# Patient Record
Sex: FEMALE | Race: WHITE | Employment: FULL TIME | URBAN - METROPOLITAN AREA
[De-identification: names, ages, dates, MRNs, and addresses within clinical notes are randomized per-mention and may not be internally consistent; named-entity substitution may affect disease eponyms.]

---

## 2017-12-19 ENCOUNTER — APPOINTMENT (OUTPATIENT)
Dept: CT IMAGING | Age: 42
End: 2017-12-19
Attending: EMERGENCY MEDICINE
Payer: COMMERCIAL

## 2017-12-19 ENCOUNTER — HOSPITAL ENCOUNTER (EMERGENCY)
Age: 42
Discharge: HOME | End: 2017-12-19
Attending: EMERGENCY MEDICINE
Payer: COMMERCIAL

## 2017-12-19 VITALS
HEIGHT: 68 IN | BODY MASS INDEX: 24.25 KG/M2 | SYSTOLIC BLOOD PRESSURE: 117 MMHG | RESPIRATION RATE: 8 BRPM | TEMPERATURE: 98 F | HEART RATE: 77 BPM | WEIGHT: 160 LBS | OXYGEN SATURATION: 99 % | DIASTOLIC BLOOD PRESSURE: 78 MMHG

## 2017-12-19 DIAGNOSIS — R07.9 LEFT SIDED CHEST PAIN: Primary | ICD-10-CM

## 2017-12-19 DIAGNOSIS — R06.00 DYSPNEA, UNSPECIFIED TYPE: ICD-10-CM

## 2017-12-19 LAB
ALBUMIN SERPL-MCNC: 3.5 G/DL (ref 3.5–5)
ALBUMIN/GLOB SERPL: 1.1 {RATIO} (ref 1.1–2.2)
ALP SERPL-CCNC: 76 U/L (ref 45–117)
ALT SERPL-CCNC: 14 U/L (ref 12–78)
ANION GAP SERPL CALC-SCNC: 10 MMOL/L (ref 5–15)
APPEARANCE UR: ABNORMAL
AST SERPL-CCNC: 14 U/L (ref 15–37)
ATRIAL RATE: 89 BPM
BACTERIA URNS QL MICRO: ABNORMAL /HPF
BASOPHILS # BLD: 0 K/UL (ref 0–0.1)
BASOPHILS NFR BLD: 0 % (ref 0–1)
BILIRUB SERPL-MCNC: 0.5 MG/DL (ref 0.2–1)
BILIRUB UR QL: NEGATIVE
BUN SERPL-MCNC: 7 MG/DL (ref 6–20)
BUN/CREAT SERPL: 9 (ref 12–20)
CALCIUM SERPL-MCNC: 8.2 MG/DL (ref 8.5–10.1)
CALCULATED P AXIS, ECG09: 63 DEGREES
CALCULATED R AXIS, ECG10: 71 DEGREES
CALCULATED T AXIS, ECG11: 55 DEGREES
CHLORIDE SERPL-SCNC: 105 MMOL/L (ref 97–108)
CO2 SERPL-SCNC: 27 MMOL/L (ref 21–32)
COLOR UR: ABNORMAL
CREAT SERPL-MCNC: 0.81 MG/DL (ref 0.55–1.02)
DIAGNOSIS, 93000: NORMAL
EOSINOPHIL # BLD: 0.1 K/UL (ref 0–0.4)
EOSINOPHIL NFR BLD: 2 % (ref 0–7)
EPITH CASTS URNS QL MICRO: ABNORMAL /LPF
ERYTHROCYTE [DISTWIDTH] IN BLOOD BY AUTOMATED COUNT: 12.9 % (ref 11.5–14.5)
GLOBULIN SER CALC-MCNC: 3.1 G/DL (ref 2–4)
GLUCOSE SERPL-MCNC: 92 MG/DL (ref 65–100)
GLUCOSE UR STRIP.AUTO-MCNC: NEGATIVE MG/DL
HCG UR QL: NEGATIVE
HCT VFR BLD AUTO: 37.1 % (ref 35–47)
HGB BLD-MCNC: 12.9 G/DL (ref 11.5–16)
HGB UR QL STRIP: NEGATIVE
KETONES UR QL STRIP.AUTO: NEGATIVE MG/DL
LEUKOCYTE ESTERASE UR QL STRIP.AUTO: NEGATIVE
LIPASE SERPL-CCNC: 99 U/L (ref 73–393)
LYMPHOCYTES # BLD: 1.8 K/UL (ref 0.8–3.5)
LYMPHOCYTES NFR BLD: 26 % (ref 12–49)
MCH RBC QN AUTO: 31.1 PG (ref 26–34)
MCHC RBC AUTO-ENTMCNC: 34.8 G/DL (ref 30–36.5)
MCV RBC AUTO: 89.4 FL (ref 80–99)
MONOCYTES # BLD: 0.5 K/UL (ref 0–1)
MONOCYTES NFR BLD: 8 % (ref 5–13)
NEUTS SEG # BLD: 4.4 K/UL (ref 1.8–8)
NEUTS SEG NFR BLD: 64 % (ref 32–75)
NITRITE UR QL STRIP.AUTO: NEGATIVE
P-R INTERVAL, ECG05: 130 MS
PH UR STRIP: 6.5 [PH] (ref 5–8)
PLATELET # BLD AUTO: 202 K/UL (ref 150–400)
POTASSIUM SERPL-SCNC: 3.9 MMOL/L (ref 3.5–5.1)
PROT SERPL-MCNC: 6.6 G/DL (ref 6.4–8.2)
PROT UR STRIP-MCNC: NEGATIVE MG/DL
Q-T INTERVAL, ECG07: 368 MS
QRS DURATION, ECG06: 72 MS
QTC CALCULATION (BEZET), ECG08: 447 MS
RBC # BLD AUTO: 4.15 M/UL (ref 3.8–5.2)
RBC #/AREA URNS HPF: ABNORMAL /HPF (ref 0–5)
SODIUM SERPL-SCNC: 142 MMOL/L (ref 136–145)
SP GR UR REFRACTOMETRY: 1.01 (ref 1–1.03)
TROPONIN I BLD-MCNC: <0.04 NG/ML (ref 0–0.08)
TROPONIN I SERPL-MCNC: <0.04 NG/ML
UR CULT HOLD, URHOLD: NORMAL
UROBILINOGEN UR QL STRIP.AUTO: 0.2 EU/DL (ref 0.2–1)
VENTRICULAR RATE, ECG03: 89 BPM
WBC # BLD AUTO: 6.9 K/UL (ref 3.6–11)
WBC URNS QL MICRO: ABNORMAL /HPF (ref 0–4)

## 2017-12-19 PROCEDURE — 71275 CT ANGIOGRAPHY CHEST: CPT

## 2017-12-19 PROCEDURE — 84484 ASSAY OF TROPONIN QUANT: CPT | Performed by: EMERGENCY MEDICINE

## 2017-12-19 PROCEDURE — 99285 EMERGENCY DEPT VISIT HI MDM: CPT

## 2017-12-19 PROCEDURE — 36415 COLL VENOUS BLD VENIPUNCTURE: CPT | Performed by: EMERGENCY MEDICINE

## 2017-12-19 PROCEDURE — 74011250636 HC RX REV CODE- 250/636: Performed by: EMERGENCY MEDICINE

## 2017-12-19 PROCEDURE — 74011636320 HC RX REV CODE- 636/320: Performed by: RADIOLOGY

## 2017-12-19 PROCEDURE — 85025 COMPLETE CBC W/AUTO DIFF WBC: CPT | Performed by: EMERGENCY MEDICINE

## 2017-12-19 PROCEDURE — 81025 URINE PREGNANCY TEST: CPT

## 2017-12-19 PROCEDURE — 96361 HYDRATE IV INFUSION ADD-ON: CPT

## 2017-12-19 PROCEDURE — 83690 ASSAY OF LIPASE: CPT | Performed by: EMERGENCY MEDICINE

## 2017-12-19 PROCEDURE — 96374 THER/PROPH/DIAG INJ IV PUSH: CPT

## 2017-12-19 PROCEDURE — 80053 COMPREHEN METABOLIC PANEL: CPT | Performed by: EMERGENCY MEDICINE

## 2017-12-19 PROCEDURE — 81001 URINALYSIS AUTO W/SCOPE: CPT | Performed by: EMERGENCY MEDICINE

## 2017-12-19 PROCEDURE — 93005 ELECTROCARDIOGRAM TRACING: CPT

## 2017-12-19 RX ORDER — BUPROPION HYDROCHLORIDE 300 MG/1
300 TABLET ORAL
COMMUNITY

## 2017-12-19 RX ORDER — KETOROLAC TROMETHAMINE 30 MG/ML
30 INJECTION, SOLUTION INTRAMUSCULAR; INTRAVENOUS
Status: COMPLETED | OUTPATIENT
Start: 2017-12-19 | End: 2017-12-19

## 2017-12-19 RX ADMIN — KETOROLAC TROMETHAMINE 30 MG: 30 INJECTION, SOLUTION INTRAMUSCULAR at 06:18

## 2017-12-19 RX ADMIN — IOPAMIDOL 80 ML: 755 INJECTION, SOLUTION INTRAVENOUS at 07:33

## 2017-12-19 RX ADMIN — SODIUM CHLORIDE 1000 ML: 900 INJECTION, SOLUTION INTRAVENOUS at 06:25

## 2017-12-19 NOTE — ED NOTES
Pt reports shortness of breath has improved, but still has a tightness feeling that increased with inspiration.

## 2017-12-19 NOTE — ED PROVIDER NOTES
HPI Comments: Jagruti Paulino is a 43 y.o. female who presents via private vehicle to the ED with a c/o left sided chest pain and SOB. Pt states that she woke up with shortness of breath. Pt states that the pain is worse when taking a deep breath. Pt denies any injury. PCP: Olean Hammans  PMHx significant for: none  PSHx significant for: breast augmentation, tummy tuck  Social Hx: Tobacco: none EtOH: none Illicit drug use: marijuana    There are no further complaints or symptoms at this time. Patient is a 43 y.o. female presenting with shortness of breath. The history is provided by the patient. No  was used. Shortness of Breath   This is a new problem. The problem occurs continuously. The current episode started 1 to 2 hours ago. The problem has not changed since onset. Associated symptoms include chest pain. Pertinent negatives include no fever, no rhinorrhea, no ear pain, no cough, no wheezing, no vomiting, no abdominal pain and no rash. She has tried nothing for the symptoms. She has had no prior hospitalizations. She has had no prior ED visits. Associated medical issues do not include asthma, COPD, pneumonia, PE or CAD. History reviewed. No pertinent past medical history. Past Surgical History:   Procedure Laterality Date    HX OTHER SURGICAL      tummy tuck, breastaugmmentation         History reviewed. No pertinent family history. Social History     Social History    Marital status:      Spouse name: N/A    Number of children: N/A    Years of education: N/A     Occupational History    Not on file.      Social History Main Topics    Smoking status: Never Smoker    Smokeless tobacco: Never Used    Alcohol use Yes      Comment: occ    Drug use: Yes     Special: Marijuana    Sexual activity: Not on file     Other Topics Concern    Not on file     Social History Narrative    No narrative on file     ALLERGIES: Review of patient's allergies indicates no known allergies. Review of Systems   Constitutional: Negative for appetite change, chills, fever and unexpected weight change. HENT: Negative for ear pain, hearing loss, rhinorrhea and trouble swallowing. Eyes: Negative for pain and visual disturbance. Respiratory: Positive for shortness of breath. Negative for cough, chest tightness and wheezing. Cardiovascular: Positive for chest pain. Negative for palpitations. Gastrointestinal: Negative for abdominal distention, abdominal pain, blood in stool and vomiting. Genitourinary: Negative for dysuria, hematuria and urgency. Musculoskeletal: Negative for back pain and myalgias. Skin: Negative for rash. Neurological: Negative for dizziness, syncope, weakness and numbness. Psychiatric/Behavioral: Negative for confusion and suicidal ideas. All other systems reviewed and are negative. Vitals:    12/19/17 0613 12/19/17 0615 12/19/17 0645 12/19/17 0739   BP: 112/71 125/69 108/67 128/71   Pulse:  93 82 73   Resp:  10 16 14   Temp:       SpO2: 97% 96% 100% 100%   Weight:       Height:                Physical Exam   Constitutional: She is oriented to person, place, and time. She appears well-developed and well-nourished. No distress. HENT:   Head: Normocephalic and atraumatic. Right Ear: External ear normal.   Left Ear: External ear normal.   Nose: Nose normal.   Mouth/Throat: Oropharynx is clear and moist. No oropharyngeal exudate. Eyes: Conjunctivae and EOM are normal. Pupils are equal, round, and reactive to light. Right eye exhibits no discharge. Left eye exhibits no discharge. No scleral icterus. Neck: Normal range of motion. Neck supple. No JVD present. No tracheal deviation present. Cardiovascular: Normal rate, regular rhythm, normal heart sounds and intact distal pulses. Exam reveals no gallop and no friction rub. No murmur heard. Pulmonary/Chest: Effort normal and breath sounds normal. No stridor. No respiratory distress.  She has no decreased breath sounds. She has no wheezes. She has no rhonchi. She has no rales. She exhibits no tenderness. Abdominal: Soft. Bowel sounds are normal. She exhibits no distension. There is no tenderness. There is no rebound and no guarding. Musculoskeletal: Normal range of motion. She exhibits no edema or tenderness. Neurological: She is alert and oriented to person, place, and time. She has normal strength and normal reflexes. No cranial nerve deficit or sensory deficit. She exhibits normal muscle tone. Coordination normal. GCS eye subscore is 4. GCS verbal subscore is 5. GCS motor subscore is 6. Skin: Skin is warm and dry. No rash noted. She is not diaphoretic. No erythema. No pallor. Psychiatric: She has a normal mood and affect. Her behavior is normal. Judgment and thought content normal.   Nursing note and vitals reviewed. MDM  Number of Diagnoses or Management Options  Dyspnea, unspecified type:   Left sided chest pain:      Amount and/or Complexity of Data Reviewed  Clinical lab tests: ordered and reviewed  Tests in the radiology section of CPT®: ordered and reviewed  Tests in the medicine section of CPT®: ordered and reviewed  Independent visualization of images, tracings, or specimens: yes (ekg)    Risk of Complications, Morbidity, and/or Mortality  Presenting problems: moderate  Diagnostic procedures: moderate  Management options: moderate    Patient Progress  Patient progress: stable    ED Course       Procedures    Chief Complaint   Patient presents with    Shortness of Breath       The patient's presenting problems have been discussed, and they are in agreement with the care plan formulated and outlined with them. I have encouraged them to ask questions as they arise throughout their visit.     MEDICATIONS GIVEN:  Medications   sodium chloride 0.9 % bolus infusion 1,000 mL (0 mL IntraVENous IV Completed 12/19/17 7848)   ketorolac (TORADOL) injection 30 mg (30 mg IntraVENous Given 12/19/17 0618)   iopamidol (ISOVUE-370) 76 % injection 100 mL (80 mL IntraVENous Given 12/19/17 0733)       LABS REVIEWED:  Recent Results (from the past 24 hour(s))   EKG, 12 LEAD, INITIAL    Collection Time: 12/19/17  5:58 AM   Result Value Ref Range    Ventricular Rate 89 BPM    Atrial Rate 89 BPM    P-R Interval 130 ms    QRS Duration 72 ms    Q-T Interval 368 ms    QTC Calculation (Bezet) 447 ms    Calculated P Axis 63 degrees    Calculated R Axis 71 degrees    Calculated T Axis 55 degrees    Diagnosis       Normal sinus rhythm  Normal ECG  No previous ECGs available     CBC WITH AUTOMATED DIFF    Collection Time: 12/19/17  6:12 AM   Result Value Ref Range    WBC 6.9 3.6 - 11.0 K/uL    RBC 4.15 3.80 - 5.20 M/uL    HGB 12.9 11.5 - 16.0 g/dL    HCT 37.1 35.0 - 47.0 %    MCV 89.4 80.0 - 99.0 FL    MCH 31.1 26.0 - 34.0 PG    MCHC 34.8 30.0 - 36.5 g/dL    RDW 12.9 11.5 - 14.5 %    PLATELET 431 862 - 753 K/uL    NEUTROPHILS 64 32 - 75 %    LYMPHOCYTES 26 12 - 49 %    MONOCYTES 8 5 - 13 %    EOSINOPHILS 2 0 - 7 %    BASOPHILS 0 0 - 1 %    ABS. NEUTROPHILS 4.4 1.8 - 8.0 K/UL    ABS. LYMPHOCYTES 1.8 0.8 - 3.5 K/UL    ABS. MONOCYTES 0.5 0.0 - 1.0 K/UL    ABS. EOSINOPHILS 0.1 0.0 - 0.4 K/UL    ABS. BASOPHILS 0.0 0.0 - 0.1 K/UL   METABOLIC PANEL, COMPREHENSIVE    Collection Time: 12/19/17  6:12 AM   Result Value Ref Range    Sodium 142 136 - 145 mmol/L    Potassium 3.9 3.5 - 5.1 mmol/L    Chloride 105 97 - 108 mmol/L    CO2 27 21 - 32 mmol/L    Anion gap 10 5 - 15 mmol/L    Glucose 92 65 - 100 mg/dL    BUN 7 6 - 20 MG/DL    Creatinine 0.81 0.55 - 1.02 MG/DL    BUN/Creatinine ratio 9 (L) 12 - 20      GFR est AA >60 >60 ml/min/1.73m2    GFR est non-AA >60 >60 ml/min/1.73m2    Calcium 8.2 (L) 8.5 - 10.1 MG/DL    Bilirubin, total 0.5 0.2 - 1.0 MG/DL    ALT (SGPT) 14 12 - 78 U/L    AST (SGOT) 14 (L) 15 - 37 U/L    Alk.  phosphatase 76 45 - 117 U/L    Protein, total 6.6 6.4 - 8.2 g/dL    Albumin 3.5 3.5 - 5.0 g/dL    Globulin 3.1 2.0 - 4.0 g/dL    A-G Ratio 1.1 1.1 - 2.2     LIPASE    Collection Time: 12/19/17  6:12 AM   Result Value Ref Range    Lipase 99 73 - 393 U/L   URINALYSIS W/MICROSCOPIC    Collection Time: 12/19/17  6:12 AM   Result Value Ref Range    Color YELLOW/STRAW      Appearance CLOUDY (A) CLEAR      Specific gravity 1.012 1.003 - 1.030      pH (UA) 6.5 5.0 - 8.0      Protein NEGATIVE  NEG mg/dL    Glucose NEGATIVE  NEG mg/dL    Ketone NEGATIVE  NEG mg/dL    Bilirubin NEGATIVE  NEG      Blood NEGATIVE  NEG      Urobilinogen 0.2 0.2 - 1.0 EU/dL    Nitrites NEGATIVE  NEG      Leukocyte Esterase NEGATIVE  NEG      WBC 5-10 0 - 4 /hpf    RBC 0-5 0 - 5 /hpf    Epithelial cells MODERATE (A) FEW /lpf    Bacteria 2+ (A) NEG /hpf   URINE CULTURE HOLD SAMPLE    Collection Time: 12/19/17  6:12 AM   Result Value Ref Range    Urine culture hold URINE ON HOLD IN MICROBIOLOGY DEPT FOR 3 DAYS     TROPONIN I    Collection Time: 12/19/17  6:12 AM   Result Value Ref Range    Troponin-I, Qt. <0.04 <0.05 ng/mL   HCG URINE, QL. - POC    Collection Time: 12/19/17  6:22 AM   Result Value Ref Range    Pregnancy test,urine (POC) NEGATIVE  NEG     POC TROPONIN-I    Collection Time: 12/19/17  7:49 AM   Result Value Ref Range    Troponin-I (POC) <0.04 0.00 - 0.08 ng/mL       VITAL SIGNS:  Patient Vitals for the past 12 hrs:   Temp Pulse Resp BP SpO2   12/19/17 0739 - 73 14 128/71 100 %   12/19/17 0645 - 82 16 108/67 100 %   12/19/17 0615 - 93 10 125/69 96 %   12/19/17 0613 - - - 112/71 97 %   12/19/17 0603 - - - - 100 %   12/19/17 0548 98.2 °F (36.8 °C) 98 18 135/77 100 %       RADIOLOGY RESULTS:  The following have been ordered and reviewed:  Cta Chest W Or W Wo Cont    Result Date: 12/19/2017  EXAM:  CTA CHEST W OR W WO CONT INDICATION:   left sided chest pain, shortness of breath COMPARISON: None. TECHNIQUE: Precontrast  images were obtained to localize the volume for acquisition.  Multislice helical CT arteriography was performed from the diaphragm to the thoracic inlet during uneventful rapid bolus of 8 0 cc Isovue-370. Lung and soft tissue windows were generated. Coronal and sagittal images were generated and 3D post processing consisting of coronal maximum intensity images was performed. CT dose reduction was achieved through use of a standardized protocol tailored for this examination and automatic exposure control for dose modulation. FINDINGS: CHEST: THYROID: No nodule. MEDIASTINUM: No mass or lymphadenopathy. LATOYA: No mass or lymphadenopathy. THORACIC AORTA: No dissection or aneurysm. MAIN PULMONARY ARTERY: There is no evidence of pulmonary embolism. TRACHEA/BRONCHI: Patent. ESOPHAGUS: No wall thickening or dilatation. HEART: Normal in size. PLEURA: No effusion or pneumothorax. LUNGS: No nodule, mass, or airspace disease. INCIDENTALLY IMAGED UPPER ABDOMEN: No focal abnormality. BONES: No destructive bone lesion. IMPRESSION: No acute process or evidence of pulmonary embolism. ED EKG interpretation:  Rhythm: normal sinus rhythm; and regular . Rate (approx.): 89; Axis: normal; P wave: normal; QRS interval: normal ; ST/T wave: normal; Other findings: normal. This EKG was interpreted by Gisela Michel DO, ED Provider. PROGRESS NOTES:  Second trop negative. CTA no PE. Pt feeling better. Discussed results and plan with patient. Patient will be discharged home with PCP followup. Patient instructed to return to the emergency room for any worsening symptoms or any other concerns. DIAGNOSIS:    1. Left sided chest pain    2.  Dyspnea, unspecified type        PLAN:  Follow-up Information     Follow up With Details Comments Contact Info    Elliot Hurst MD Schedule an appointment as soon as possible for a visit  [place] [address]      [facility] EMERGENCY DEPT  If symptoms worsen [address] [place] [address]  [phone]        Current Discharge Medication List CONTINUE these medications which have NOT CHANGED    Details   buPROPion XL (WELLBUTRIN XL) 300 mg XL tablet Take 300 mg by mouth every morning. ED COURSE: The patient's hospital course has been uncomplicated.

## 2017-12-19 NOTE — ED NOTES
Bedside and Verbal shift change report given to Kurt Carrillo RN (oncoming nurse) by Justina Brown RN (offgoing nurse). Report included the following information SBAR, Kardex, ED Summary, MAR and Accordion.

## 2017-12-19 NOTE — DISCHARGE INSTRUCTIONS
We hope that we have addressed all of your medical concerns. The examination and treatment you received in the Emergency Department were for an emergent problem and were not intended as complete care. It is important that you follow up with your healthcare provider(s) for ongoing care. If your symptoms worsen or do not improve as expected, and you are unable to reach your usual health care provider(s), you should return to the Emergency Department. Today's healthcare is undergoing tremendous change, and patient satisfaction surveys are one of the many tools to assess the quality of medical care. You may receive a survey from the [employer] regarding your experience in the Emergency Department. I hope that your experience has been completely positive, particularly the medical care that I provided. As such, please participate in the survey; anything less than excellent does not meet my expectations or intentions. [facility] and [facility] participate in nationally recognized quality of care measures. If your blood pressure is greater than 120/80, as reported below, we urge that you seek medical care to address the potential of high blood pressure, commonly known as hypertension. Hypertension can be hereditary or can be caused by certain medical conditions, pain, stress, or \"white coat syndrome. \"       Please make an appointment with your health care provider(s) for follow up of your Emergency Department visit. VITALS:   Patient Vitals for the past 8 hrs:   Temp Pulse Resp BP SpO2   12/19/17 0739 - 73 14 128/71 100 %   12/19/17 0645 - 82 16 108/67 100 %   12/19/17 0615 - 93 10 125/69 96 %   12/19/17 0613 - - - 112/71 97 %   12/19/17 0603 - - - - 100 %   12/19/17 0548 98.2 °F (36.8 °C) 98 18 135/77 100 %          Thank you for allowing us to provide you with medical care today.   We realize that you have many choices for your emergency care needs. Please choose us in the future for any continued health care needs. Esau Ross [address] [place] [address].   Office: [phone]            Recent Results (from the past 24 hour(s))   EKG, 12 LEAD, INITIAL    Collection Time: 12/19/17  5:58 AM   Result Value Ref Range    Ventricular Rate 89 BPM    Atrial Rate 89 BPM    P-R Interval 130 ms    QRS Duration 72 ms    Q-T Interval 368 ms    QTC Calculation (Bezet) 447 ms    Calculated P Axis 63 degrees    Calculated R Axis 71 degrees    Calculated T Axis 55 degrees    Diagnosis       Normal sinus rhythm  Normal ECG  No previous ECGs available     CBC WITH AUTOMATED DIFF    Collection Time: 12/19/17  6:12 AM   Result Value Ref Range    WBC 6.9 3.6 - 11.0 K/uL    RBC 4.15 3.80 - 5.20 M/uL    HGB 12.9 11.5 - 16.0 g/dL    HCT 37.1 35.0 - 47.0 %    MCV 89.4 80.0 - 99.0 FL    MCH 31.1 26.0 - 34.0 PG    MCHC 34.8 30.0 - 36.5 g/dL    RDW 12.9 11.5 - 14.5 %    PLATELET 153 913 - 486 K/uL    NEUTROPHILS 64 32 - 75 %    LYMPHOCYTES 26 12 - 49 %    MONOCYTES 8 5 - 13 %    EOSINOPHILS 2 0 - 7 %    BASOPHILS 0 0 - 1 %    ABS. NEUTROPHILS 4.4 1.8 - 8.0 K/UL    ABS. LYMPHOCYTES 1.8 0.8 - 3.5 K/UL    ABS. MONOCYTES 0.5 0.0 - 1.0 K/UL    ABS. EOSINOPHILS 0.1 0.0 - 0.4 K/UL    ABS. BASOPHILS 0.0 0.0 - 0.1 K/UL   METABOLIC PANEL, COMPREHENSIVE    Collection Time: 12/19/17  6:12 AM   Result Value Ref Range    Sodium 142 136 - 145 mmol/L    Potassium 3.9 3.5 - 5.1 mmol/L    Chloride 105 97 - 108 mmol/L    CO2 27 21 - 32 mmol/L    Anion gap 10 5 - 15 mmol/L    Glucose 92 65 - 100 mg/dL    BUN 7 6 - 20 MG/DL    Creatinine 0.81 0.55 - 1.02 MG/DL    BUN/Creatinine ratio 9 (L) 12 - 20      GFR est AA >60 >60 ml/min/1.73m2    GFR est non-AA >60 >60 ml/min/1.73m2    Calcium 8.2 (L) 8.5 - 10.1 MG/DL    Bilirubin, total 0.5 0.2 - 1.0 MG/DL    ALT (SGPT) 14 12 - 78 U/L    AST (SGOT) 14 (L) 15 - 37 U/L    Alk.  phosphatase 76 45 - 117 U/L Protein, total 6.6 6.4 - 8.2 g/dL    Albumin 3.5 3.5 - 5.0 g/dL    Globulin 3.1 2.0 - 4.0 g/dL    A-G Ratio 1.1 1.1 - 2.2     LIPASE    Collection Time: 12/19/17  6:12 AM   Result Value Ref Range    Lipase 99 73 - 393 U/L   URINALYSIS W/MICROSCOPIC    Collection Time: 12/19/17  6:12 AM   Result Value Ref Range    Color YELLOW/STRAW      Appearance CLOUDY (A) CLEAR      Specific gravity 1.012 1.003 - 1.030      pH (UA) 6.5 5.0 - 8.0      Protein NEGATIVE  NEG mg/dL    Glucose NEGATIVE  NEG mg/dL    Ketone NEGATIVE  NEG mg/dL    Bilirubin NEGATIVE  NEG      Blood NEGATIVE  NEG      Urobilinogen 0.2 0.2 - 1.0 EU/dL    Nitrites NEGATIVE  NEG      Leukocyte Esterase NEGATIVE  NEG      WBC 5-10 0 - 4 /hpf    RBC 0-5 0 - 5 /hpf    Epithelial cells MODERATE (A) FEW /lpf    Bacteria 2+ (A) NEG /hpf   URINE CULTURE HOLD SAMPLE    Collection Time: 12/19/17  6:12 AM   Result Value Ref Range    Urine culture hold URINE ON HOLD IN MICROBIOLOGY DEPT FOR 3 DAYS     TROPONIN I    Collection Time: 12/19/17  6:12 AM   Result Value Ref Range    Troponin-I, Qt. <0.04 <0.05 ng/mL   HCG URINE, QL. - POC    Collection Time: 12/19/17  6:22 AM   Result Value Ref Range    Pregnancy test,urine (POC) NEGATIVE  NEG         Cta Chest W Or W Wo Cont    Result Date: 12/19/2017  EXAM:  CTA CHEST W OR W WO CONT INDICATION:   left sided chest pain, shortness of breath COMPARISON: None. TECHNIQUE: Precontrast  images were obtained to localize the volume for acquisition. Multislice helical CT arteriography was performed from the diaphragm to the thoracic inlet during uneventful rapid bolus of 8 0 cc Isovue-370. Lung and soft tissue windows were generated. Coronal and sagittal images were generated and 3D post processing consisting of coronal maximum intensity images was performed. CT dose reduction was achieved through use of a standardized protocol tailored for this examination and automatic exposure control for dose modulation.   FINDINGS: CHEST: THYROID: No nodule. MEDIASTINUM: No mass or lymphadenopathy. LATOYA: No mass or lymphadenopathy. THORACIC AORTA: No dissection or aneurysm. MAIN PULMONARY ARTERY: There is no evidence of pulmonary embolism. TRACHEA/BRONCHI: Patent. ESOPHAGUS: No wall thickening or dilatation. HEART: Normal in size. PLEURA: No effusion or pneumothorax. LUNGS: No nodule, mass, or airspace disease. INCIDENTALLY IMAGED UPPER ABDOMEN: No focal abnormality. BONES: No destructive bone lesion. IMPRESSION: No acute process or evidence of pulmonary embolism. Chest Pain: Care Instructions  Your Care Instructions    There are many things that can cause chest pain. Some are not serious and will get better on their own in a few days. But some kinds of chest pain need more testing and treatment. Your doctor may have recommended a follow-up visit in the next 8 to 12 hours. If you are not getting better, you may need more tests or treatment. Even though your doctor has released you, you still need to watch for any problems. The doctor carefully checked you, but sometimes problems can develop later. If you have new symptoms or if your symptoms do not get better, get medical care right away. If you have worse or different chest pain or pressure that lasts more than 5 minutes or you passed out (lost consciousness), call 911 or seek other emergency help right away. A medical visit is only one step in your treatment. Even if you feel better, you still need to do what your doctor recommends, such as going to all suggested follow-up appointments and taking medicines exactly as directed. This will help you recover and help prevent future problems. How can you care for yourself at home? · Rest until you feel better. · Take your medicine exactly as prescribed. Call your doctor if you think you are having a problem with your medicine. · Do not drive after taking a prescription pain medicine. When should you call for help?   Call 911 if:  ? · You passed out (lost consciousness). ? · You have severe difficulty breathing. ? · You have symptoms of a heart attack. These may include:  ¨ Chest pain or pressure, or a strange feeling in your chest.  ¨ Sweating. ¨ Shortness of breath. ¨ Nausea or vomiting. ¨ Pain, pressure, or a strange feeling in your back, neck, jaw, or upper belly or in one or both shoulders or arms. ¨ Lightheadedness or sudden weakness. ¨ A fast or irregular heartbeat. After you call 911, the  may tell you to chew 1 adult-strength or 2 to 4 low-dose aspirin. Wait for an ambulance. Do not try to drive yourself. ?Call your doctor today if:  ? · You have any trouble breathing. ? · Your chest pain gets worse. ? · You are dizzy or lightheaded, or you feel like you may faint. ? · You are not getting better as expected. ? · You are having new or different chest pain. Where can you learn more? Go to http://tramaine-latonya.info/. Enter A120 in the search box to learn more about \"Chest Pain: Care Instructions. \"  Current as of: March 20, 2017  Content Version: 11.4  © 9712-3530 [employer]. Care instructions adapted under license by [employer] (which disclaims liability or warranty for this information). If you have questions about a medical condition or this instruction, always ask your healthcare professional. [place] [address] any warranty or liability for your use of this information. Shortness of Breath: Care Instructions  Your Care Instructions  Shortness of breath has many causes. Sometimes conditions such as anxiety can lead to shortness of breath. Some people get mild shortness of breath when they exercise. Trouble breathing also can be a symptom of a serious problem, such as asthma, lung disease, emphysema, heart problems, and pneumonia. If your shortness of breath continues, you may need tests and treatment.  Watch for any changes in your breathing and other symptoms. Follow-up care is a key part of your treatment and safety. Be sure to make and go to all appointments, and call your doctor if you are having problems. It's also a good idea to know your test results and keep a list of the medicines you take. How can you care for yourself at home? · Do not smoke or allow others to smoke around you. If you need help quitting, talk to your doctor about stop-smoking programs and medicines. These can increase your chances of quitting for good. · Get plenty of rest and sleep. · Take your medicines exactly as prescribed. Call your doctor if you think you are having a problem with your medicine. · Find healthy ways to deal with stress. ¨ Exercise daily. ¨ Get plenty of sleep. ¨ Eat regularly and well. When should you call for help? Call 911 anytime you think you may need emergency care. For example, call if:  ? · You have severe shortness of breath. ? · You have symptoms of a heart attack. These may include:  ¨ Chest pain or pressure, or a strange feeling in the chest.  ¨ Sweating. ¨ Shortness of breath. ¨ Nausea or vomiting. ¨ Pain, pressure, or a strange feeling in the back, neck, jaw, or upper belly or in one or both shoulders or arms. ¨ Lightheadedness or sudden weakness. ¨ A fast or irregular heartbeat. After you call 911, the  may tell you to chew 1 adult-strength or 2 to 4 low-dose aspirin. Wait for an ambulance. Do not try to drive yourself. ?Call your doctor now or seek immediate medical care if:  ? · Your shortness of breath gets worse or you start to wheeze. Wheezing is a high-pitched sound when you breathe. ? · You wake up at night out of breath or have to prop your head up on several pillows to breathe. ? · You are short of breath after only light activity or while at rest.   ? Watch closely for changes in your health, and be sure to contact your doctor if:  ? · You do not get better over the next 1 to 2 days. Where can you learn more? Go to http://tramaine-latonya.info/. Enter S780 in the search box to learn more about \"Shortness of Breath: Care Instructions. \"  Current as of: May 12, 2017  Content Version: 11.4  © 2534-0711 [employer]. Care instructions adapted under license by [employer] (which disclaims liability or warranty for this information). If you have questions about a medical condition or this instruction, always ask your healthcare professional. [address] any warranty or liability for your use of this information. Musculoskeletal Chest Pain: Care Instructions  Your Care Instructions    Chest pain is not always a sign that something is wrong with your heart or that you have another serious problem. The doctor thinks your chest pain is caused by strained muscles or ligaments, inflamed chest cartilage, or another problem in your chest, rather than by your heart. You may need more tests to find the cause of your chest pain. Follow-up care is a key part of your treatment and safety. Be sure to make and go to all appointments, and call your doctor if you are having problems. It's also a good idea to know your test results and keep a list of the medicines you take. How can you care for yourself at home? · Take pain medicines exactly as directed. ¨ If the doctor gave you a prescription medicine for pain, take it as prescribed. ¨ If you are not taking a prescription pain medicine, ask your doctor if you can take an over-the-counter medicine. · Rest and protect the sore area. · Stop, change, or take a break from any activity that may be causing your pain or soreness. · Put ice or a cold pack on the sore area for 10 to 20 minutes at a time. Try to do this every 1 to 2 hours for the next 3 days (when you are awake) or until the swelling goes down. Put a thin cloth between the ice and your skin.   · After 2 or 3 days, apply a heating pad set on low or a warm cloth to the area that hurts. Some doctors suggest that you go back and forth between hot and cold. · Do not wrap or tape your ribs for support. This may cause you to take smaller breaths, which could increase your risk of lung problems. · Mentholated creams such as Bengay or Icy Hot may soothe sore muscles. Follow the instructions on the package. · Follow your doctor's instructions for exercising. · Gentle stretching and massage may help you get better faster. Stretch slowly to the point just before pain begins, and hold the stretch for at least 15 to 30 seconds. Do this 3 or 4 times a day. Stretch just after you have applied heat. · As your pain gets better, slowly return to your normal activities. Any increased pain may be a sign that you need to rest a while longer. When should you call for help? Call 911 anytime you think you may need emergency care. For example, call if:  ? · You have chest pain or pressure. This may occur with:  ¨ Sweating. ¨ Shortness of breath. ¨ Nausea or vomiting. ¨ Pain that spreads from the chest to the neck, jaw, or one or both shoulders or arms. ¨ Dizziness or lightheadedness. ¨ A fast or uneven pulse. After calling 911, chew 1 adult-strength aspirin. Wait for an ambulance. Do not try to drive yourself. ? · You have sudden chest pain and shortness of breath, or you cough up blood. ?Call your doctor now or seek immediate medical care if:  ? · You have any trouble breathing. ? · Your chest pain gets worse. ? · Your chest pain occurs consistently with exercise and is relieved by rest.   ? Watch closely for changes in your health, and be sure to contact your doctor if:  ? · Your chest pain does not get better after 1 week. Where can you learn more? Go to http://tramaine-latonya.info/. Enter V293 in the search box to learn more about \"Musculoskeletal Chest Pain: Care Instructions. \"  Current as of: March 20, 2017  Content Version: 11.4  © 1779-1166 [employer]. Care instructions adapted under license by [employer] (which disclaims liability or warranty for this information). If you have questions about a medical condition or this instruction, always ask your healthcare professional. [address] any warranty or liability for your use of this information.

## 2017-12-19 NOTE — ED TRIAGE NOTES
Triage: Woke up with shortness of breath. Worse when taking a deep breath. Also having left sided rib pain. Denies any injury.

## 2019-10-10 ENCOUNTER — OFFICE VISIT (OUTPATIENT)
Dept: NEUROLOGY | Age: 44
End: 2019-10-10

## 2019-10-10 VITALS
HEIGHT: 68 IN | WEIGHT: 152 LBS | RESPIRATION RATE: 14 BRPM | SYSTOLIC BLOOD PRESSURE: 98 MMHG | BODY MASS INDEX: 23.04 KG/M2 | OXYGEN SATURATION: 98 % | DIASTOLIC BLOOD PRESSURE: 76 MMHG | HEART RATE: 93 BPM

## 2019-10-10 DIAGNOSIS — G25.0 ESSENTIAL TREMOR: Primary | ICD-10-CM

## 2019-10-10 NOTE — PROGRESS NOTES
[facility] and [address] at [facility] at [address] [place] [address], [address] [facility] [address]  [phone] Office  [phone] Facsimile           Referring: Nancy Cruz MD      Chief Complaint   Patient presents with    New Patient    Tremors     bilat but more noticable in right hand when gripping/lifting objects. Started approx 2 yrs ago     20-year-old woman here for evaluation of what she calls tremor. She notices about 2 years ago. She only notices it when she is holding something  tightly. If she is holding something heavy it occurs as well. She we will have difficulty holding a screwdriver. She will shake her tea cup. No rest tremor. No head tremor. No vocal tremor. No family history of tremor although her mother has just been diagnosed with ALS and she is being cared for at [facility]. She became a little worried once that diagnosis was made. It is not bothersome to her. Other people have noticed. The first time she noticed it was when she was lighting a candle. She is unsure as to whether or not alcohol helps or whether it gets worse or better with alcohol. No focal issues. No new medicines. No injury. No palpitations chest pain shortness of breath. No fever chills. History reviewed. No pertinent past medical history. Past Surgical History:   Procedure Laterality Date    HX OTHER SURGICAL  2016    tummy tuck, breast lift       Current Outpatient Medications   Medication Sig Dispense Refill    buPROPion XL (WELLBUTRIN XL) 300 mg XL tablet Take 300 mg by mouth every morning.           No Known Allergies    Social History     Tobacco Use    Smoking status: Never Smoker    Smokeless tobacco: Never Used   Substance Use Topics    Alcohol use: Yes     Comment: occ    Drug use: Yes     Types: Marijuana       Family History   Problem Relation Age of Onset    Other Mother         ALS    Headache Father     Dementia Maternal Grandmother     Heart Disease Maternal Grandmother     Canavan disease Maternal Grandfather     Cancer Maternal Grandfather         skin    Stroke Paternal Grandfather        Review of Systems  Pertinent positives and negatives as noted with remainder of comprehensive review negative    Examination  Visit Vitals  BP 98/76 (BP 1 Location: Left arm, BP Patient Position: Sitting)   Pulse 93   Resp 14   Ht 5' 8\" (1.727 m)   Wt 68.9 kg (152 lb)   LMP 10/10/2019   SpO2 98%   BMI 23.11 kg/m²     Pleasant, well appearing. Dress and grooming are appropriate. No scleral icterus is present. Oropharynx is clear. Supple neck without bruit appreciated. Heart regular. Pulses are symmetrical.  No edema in the lower extremities. Pleasant lady. Appropriately dressed properly groomed and she has an appropriate affect. No scleral icterus. Her heart is regular. No edema. Symmetric pulses. Neurologically she is awake alert oriented and conversant. Speech and language normal.  Cognition normal.  Cranial nerves intact 2-12. No nystagmus. She has no pronation or drift. No abnormality of bulk or tone including specifically no cogwheeling. She has postural tremor of minimal degree of the outstretched hands. Intention is present on finger-nose-finger. Full resistance in all muscle groups in the upper and lower extremities in all muscle groups to direct testing. Symmetrical reflexes upper and lower extremities bilaterally. No pathologic reflexes. No ataxia. Impression/Plan  40year-old with essential tremor. Discussed the difference between Parkinson's disease and essential tremor. Discussed the benign but bothersome course. Discussed potential treatment modalities  Including Mysoline and Inderal as well as their potential side effects.   Discussed also this is a disorder that does not need to be treated unless bothersome enough to take a daily medication and the medications do nothing but treat the symptoms, not the disorder--ie no cure. At this point is not bothersome enough to take medicine. We will take a wait-and-see approach. Should she worsen she will let us know. Hung Jimenez MD    This note was created using voice recognition software. Despite editing, there may be syntax errors. This note will not be viewable in [address]

## 2019-10-10 NOTE — PATIENT INSTRUCTIONS
RESULT POLICY If we have ordered testing for you, know that; \"NO NEWS IS GOOD NEWS! \" It is our policy that we know longer call patients with results, nor do we  give test results over the phone. We schedule follow up appointments so that your results can be discussed in person. This allows you to address any questions you have regarding the results. If you choose to go to an imaging center outside of [facility], it is your responsibility to bring imaging report and disc to follow up appointment. If something of concern is revealed on your test, we will contact you to discuss the matter and if needed schedule a sooner follow up appointment. Additionally, results may be found by using the My Chart feature and one of our patient service representatives at the  can give you instructions on how to access this feature to utilize our electronic medical record system. Thank you for your understanding. PRESCRIPTION REFILL POLICY [facility] Statement to Patients April 1, 2014 In an effort to ensure the large volume of patient prescription refills is processed in the most efficient and expeditious manner, we are asking our patients to assist us by calling your Pharmacy for all prescription refills, this will include also your  Mail Order Pharmacy. The pharmacy will contact our office electronically to continue the refill process. Please do not wait until the last minute to call your pharmacy. We need at least 48 hours (2days) to fill prescriptions. We also encourage you to call your pharmacy before going to  your prescription to make sure it is ready. With regard to controlled substance prescription refill requests (narcotic refills) that need to be picked up at our office, we ask your cooperation by providing us with at least 72 hours (3days) notice that you will need a refill. We will not refill narcotic prescription refill requests after 4:00pm on any weekday, Monday through Thursday, or after 2:00pm on Fridays, or on the weekends. We encourage everyone to explore another way of getting your prescription refill request processed using [employer], our patient web portal through our electronic medical record system. [employer] is an efficient and effective way to communicate your medication request directly to the office and  downloadable as an royce on your smart phone . [employer] also features a review functionality that allows you to view your medication list as well as leave messages for your physician. Are you ready to get connected? If so please review the attatched instructions or speak to any of our staff to get you set up right away! Thank you so much for your cooperation. Should you have any questions please contact our Practice Administrator.

## 2024-07-30 ENCOUNTER — HOSPITAL ENCOUNTER (OUTPATIENT)
Facility: HOSPITAL | Age: 49
Discharge: HOME | End: 2024-08-02

## 2024-07-30 VITALS
TEMPERATURE: 97.5 F | BODY MASS INDEX: 23.37 KG/M2 | SYSTOLIC BLOOD PRESSURE: 133 MMHG | HEART RATE: 87 BPM | WEIGHT: 148.9 LBS | DIASTOLIC BLOOD PRESSURE: 69 MMHG | OXYGEN SATURATION: 97 % | HEIGHT: 67 IN | RESPIRATION RATE: 16 BRPM

## 2024-07-30 RX ORDER — TRIAMCINOLONE ACETONIDE 1 MG/G
CREAM TOPICAL AS NEEDED
COMMUNITY

## 2024-07-30 RX ORDER — IBUPROFEN 200 MG
400 TABLET ORAL AS NEEDED
COMMUNITY

## 2024-07-30 RX ORDER — BUPROPION HYDROCHLORIDE 300 MG/1
300 TABLET ORAL EVERY MORNING
COMMUNITY

## 2024-07-30 RX ORDER — TRAZODONE HYDROCHLORIDE 50 MG/1
50 TABLET ORAL NIGHTLY
COMMUNITY

## 2024-07-30 ASSESSMENT — PAIN SCALES - GENERAL: PAINLEVEL_OUTOF10: 8

## 2024-07-30 ASSESSMENT — PAIN DESCRIPTION - DESCRIPTORS: DESCRIPTORS: DISCOMFORT

## 2024-07-30 ASSESSMENT — PAIN DESCRIPTION - LOCATION: LOCATION: FOOT

## 2024-07-30 NOTE — DISCHARGE INSTRUCTIONS
buns, or metal hair clips  Wear loose, comfortable, clean clothes  Wear glasses instead of contacts  Leave money, valuables, and jewelry, including body piercings, at home       Going Home - or Spending the Night SAME-DAY SURGERY: You must have a responsible adult drive you home and stay with you 24 hours after surgery  ADMITS: If your doctor is keeping you in the hospital after surgery, leave personal belongings/luggage in your car until you have a hospital room number.       Hospital discharge time is 12 noon     Drivers must be here before 12 noon unless you are told differently   Special Instructions           Follow all instructions so your surgery won’t be cancelled.  Please, be on time.                    If a situation occurs and you are delayed the day of surgery, call [phone]     If your physical condition changes (like a fever, cold, flu, etc.) call your surgeon.    Home medication(s) reviewed and verified verbally with list during PAT appointment.

## 2024-07-31 ENCOUNTER — ANESTHESIA EVENT (OUTPATIENT)
Facility: HOSPITAL | Age: 49
End: 2024-07-31
Payer: COMMERCIAL

## 2024-08-02 ENCOUNTER — HOSPITAL ENCOUNTER (OUTPATIENT)
Facility: HOSPITAL | Age: 49
Discharge: HOME | End: 2024-08-05

## 2024-08-02 ENCOUNTER — ANESTHESIA (OUTPATIENT)
Facility: HOSPITAL | Age: 49
End: 2024-08-02
Payer: COMMERCIAL

## 2024-08-02 ENCOUNTER — APPOINTMENT (OUTPATIENT)
Facility: HOSPITAL | Age: 49
End: 2024-08-02
Attending: PODIATRIST
Payer: COMMERCIAL

## 2024-08-02 ENCOUNTER — HOSPITAL ENCOUNTER (OUTPATIENT)
Facility: HOSPITAL | Age: 49
Setting detail: OUTPATIENT SURGERY
Discharge: HOME | End: 2024-08-02
Attending: PODIATRIST | Admitting: PODIATRIST
Payer: COMMERCIAL

## 2024-08-02 VITALS
WEIGHT: 149.25 LBS | DIASTOLIC BLOOD PRESSURE: 69 MMHG | SYSTOLIC BLOOD PRESSURE: 116 MMHG | RESPIRATION RATE: 8 BRPM | HEIGHT: 63 IN | BODY MASS INDEX: 26.45 KG/M2 | TEMPERATURE: 97.5 F | HEART RATE: 71 BPM | OXYGEN SATURATION: 99 %

## 2024-08-02 LAB — HCG UR QL: NEGATIVE

## 2024-08-02 PROCEDURE — 6360000002 HC RX W HCPCS: Performed by: NURSE ANESTHETIST, CERTIFIED REGISTERED

## 2024-08-02 PROCEDURE — 73630 X-RAY EXAM OF FOOT: CPT

## 2024-08-02 PROCEDURE — 7100000001 HC PACU RECOVERY - ADDTL 15 MIN: Performed by: PODIATRIST

## 2024-08-02 PROCEDURE — 2500000003 HC RX 250 WO HCPCS: Performed by: NURSE ANESTHETIST, CERTIFIED REGISTERED

## 2024-08-02 PROCEDURE — 3600000004 HC SURGERY LEVEL 4 BASE: Performed by: PODIATRIST

## 2024-08-02 PROCEDURE — 7100000010 HC PHASE II RECOVERY - FIRST 15 MIN: Performed by: PODIATRIST

## 2024-08-02 PROCEDURE — 2709999900 HC NON-CHARGEABLE SUPPLY: Performed by: PODIATRIST

## 2024-08-02 PROCEDURE — 2580000003 HC RX 258: Performed by: ANESTHESIOLOGY

## 2024-08-02 PROCEDURE — 7100000011 HC PHASE II RECOVERY - ADDTL 15 MIN: Performed by: PODIATRIST

## 2024-08-02 PROCEDURE — 2720000010 HC SURG SUPPLY STERILE: Performed by: PODIATRIST

## 2024-08-02 PROCEDURE — C1713 ANCHOR/SCREW BN/BN,TIS/BN: HCPCS | Performed by: PODIATRIST

## 2024-08-02 PROCEDURE — 7100000000 HC PACU RECOVERY - FIRST 15 MIN: Performed by: PODIATRIST

## 2024-08-02 PROCEDURE — C1769 GUIDE WIRE: HCPCS | Performed by: PODIATRIST

## 2024-08-02 PROCEDURE — 3600000014 HC SURGERY LEVEL 4 ADDTL 15MIN: Performed by: PODIATRIST

## 2024-08-02 PROCEDURE — 3700000000 HC ANESTHESIA ATTENDED CARE: Performed by: PODIATRIST

## 2024-08-02 PROCEDURE — 6360000002 HC RX W HCPCS: Performed by: PODIATRIST

## 2024-08-02 PROCEDURE — 3700000001 HC ADD 15 MINUTES (ANESTHESIA): Performed by: PODIATRIST

## 2024-08-02 PROCEDURE — 81025 URINE PREGNANCY TEST: CPT

## 2024-08-02 DEVICE — IMPLANTABLE DEVICE: Type: IMPLANTABLE DEVICE | Site: FOOT | Status: FUNCTIONAL

## 2024-08-02 DEVICE — HEADLESS COMPRES SCRW 2.5X18MM: Type: IMPLANTABLE DEVICE | Site: FOOT | Status: FUNCTIONAL

## 2024-08-02 RX ORDER — LIDOCAINE HYDROCHLORIDE 10 MG/ML
1 INJECTION, SOLUTION EPIDURAL; INFILTRATION; INTRACAUDAL; PERINEURAL
Status: DISCONTINUED | OUTPATIENT
Start: 2024-08-02 | End: 2024-08-02 | Stop reason: HOSPADM

## 2024-08-02 RX ORDER — DEXAMETHASONE SODIUM PHOSPHATE 4 MG/ML
INJECTION, SOLUTION INTRA-ARTICULAR; INTRALESIONAL; INTRAMUSCULAR; INTRAVENOUS; SOFT TISSUE PRN
Status: DISCONTINUED | OUTPATIENT
Start: 2024-08-02 | End: 2024-08-02 | Stop reason: SDUPTHER

## 2024-08-02 RX ORDER — MIDAZOLAM HYDROCHLORIDE 1 MG/ML
INJECTION INTRAMUSCULAR; INTRAVENOUS PRN
Status: DISCONTINUED | OUTPATIENT
Start: 2024-08-02 | End: 2024-08-02 | Stop reason: SDUPTHER

## 2024-08-02 RX ORDER — SODIUM CHLORIDE, SODIUM LACTATE, POTASSIUM CHLORIDE, CALCIUM CHLORIDE 600; 310; 30; 20 MG/100ML; MG/100ML; MG/100ML; MG/100ML
INJECTION, SOLUTION INTRAVENOUS CONTINUOUS
Status: DISCONTINUED | OUTPATIENT
Start: 2024-08-02 | End: 2024-08-02 | Stop reason: HOSPADM

## 2024-08-02 RX ORDER — DIPHENHYDRAMINE HYDROCHLORIDE 50 MG/ML
12.5 INJECTION INTRAMUSCULAR; INTRAVENOUS
Status: DISCONTINUED | OUTPATIENT
Start: 2024-08-02 | End: 2024-08-02 | Stop reason: HOSPADM

## 2024-08-02 RX ORDER — EPHEDRINE SULFATE/0.9% NACL/PF 25 MG/5 ML
SYRINGE (ML) INTRAVENOUS PRN
Status: DISCONTINUED | OUTPATIENT
Start: 2024-08-02 | End: 2024-08-02 | Stop reason: SDUPTHER

## 2024-08-02 RX ORDER — BUPIVACAINE HYDROCHLORIDE 5 MG/ML
INJECTION, SOLUTION PERINEURAL PRN
Status: DISCONTINUED | OUTPATIENT
Start: 2024-08-02 | End: 2024-08-02 | Stop reason: HOSPADM

## 2024-08-02 RX ORDER — FENTANYL CITRATE 50 UG/ML
INJECTION, SOLUTION INTRAMUSCULAR; INTRAVENOUS PRN
Status: DISCONTINUED | OUTPATIENT
Start: 2024-08-02 | End: 2024-08-02 | Stop reason: SDUPTHER

## 2024-08-02 RX ORDER — FENTANYL CITRATE 50 UG/ML
100 INJECTION, SOLUTION INTRAMUSCULAR; INTRAVENOUS
Status: DISCONTINUED | OUTPATIENT
Start: 2024-08-02 | End: 2024-08-02 | Stop reason: HOSPADM

## 2024-08-02 RX ORDER — MIDAZOLAM HYDROCHLORIDE 2 MG/2ML
2 INJECTION, SOLUTION INTRAMUSCULAR; INTRAVENOUS
Status: DISCONTINUED | OUTPATIENT
Start: 2024-08-02 | End: 2024-08-02 | Stop reason: HOSPADM

## 2024-08-02 RX ORDER — ONDANSETRON 2 MG/ML
4 INJECTION INTRAMUSCULAR; INTRAVENOUS
Status: DISCONTINUED | OUTPATIENT
Start: 2024-08-02 | End: 2024-08-02 | Stop reason: HOSPADM

## 2024-08-02 RX ORDER — PROPOFOL 10 MG/ML
INJECTION, EMULSION INTRAVENOUS PRN
Status: DISCONTINUED | OUTPATIENT
Start: 2024-08-02 | End: 2024-08-02 | Stop reason: SDUPTHER

## 2024-08-02 RX ORDER — NALOXONE HYDROCHLORIDE 0.4 MG/ML
INJECTION, SOLUTION INTRAMUSCULAR; INTRAVENOUS; SUBCUTANEOUS PRN
Status: DISCONTINUED | OUTPATIENT
Start: 2024-08-02 | End: 2024-08-02 | Stop reason: HOSPADM

## 2024-08-02 RX ADMIN — Medication 10 MG: at 11:43

## 2024-08-02 RX ADMIN — PROPOFOL 30 MG: 10 INJECTION, EMULSION INTRAVENOUS at 10:53

## 2024-08-02 RX ADMIN — EPHEDRINE SULFATE 5 MG: 5 INJECTION INTRAVENOUS at 11:36

## 2024-08-02 RX ADMIN — SODIUM CHLORIDE, POTASSIUM CHLORIDE, SODIUM LACTATE AND CALCIUM CHLORIDE: 600; 310; 30; 20 INJECTION, SOLUTION INTRAVENOUS at 11:59

## 2024-08-02 RX ADMIN — FENTANYL CITRATE 50 MCG: 50 INJECTION, SOLUTION INTRAMUSCULAR; INTRAVENOUS at 10:51

## 2024-08-02 RX ADMIN — MIDAZOLAM HYDROCHLORIDE 2 MG: 1 INJECTION, SOLUTION INTRAMUSCULAR; INTRAVENOUS at 10:46

## 2024-08-02 RX ADMIN — PROPOFOL 75 MCG/KG/MIN: 10 INJECTION, EMULSION INTRAVENOUS at 10:54

## 2024-08-02 RX ADMIN — PROPOFOL 26 MG: 10 INJECTION, EMULSION INTRAVENOUS at 11:41

## 2024-08-02 RX ADMIN — SODIUM CHLORIDE, POTASSIUM CHLORIDE, SODIUM LACTATE AND CALCIUM CHLORIDE: 600; 310; 30; 20 INJECTION, SOLUTION INTRAVENOUS at 09:53

## 2024-08-02 RX ADMIN — Medication 10 MG: at 11:09

## 2024-08-02 RX ADMIN — DEXAMETHASONE SODIUM PHOSPHATE 8 MG: 4 INJECTION, SOLUTION INTRAMUSCULAR; INTRAVENOUS at 10:46

## 2024-08-02 RX ADMIN — FENTANYL CITRATE 50 MCG: 50 INJECTION, SOLUTION INTRAMUSCULAR; INTRAVENOUS at 10:46

## 2024-08-02 RX ADMIN — PROPOFOL 24 MG: 10 INJECTION, EMULSION INTRAVENOUS at 10:59

## 2024-08-02 RX ADMIN — EPHEDRINE SULFATE 10 MG: 5 INJECTION INTRAVENOUS at 11:12

## 2024-08-02 RX ADMIN — Medication 30 MG: at 10:58

## 2024-08-02 ASSESSMENT — PAIN DESCRIPTION - DESCRIPTORS: DESCRIPTORS: ACHING

## 2024-08-02 ASSESSMENT — PAIN - FUNCTIONAL ASSESSMENT
PAIN_FUNCTIONAL_ASSESSMENT: ACTIVITIES ARE NOT PREVENTED
PAIN_FUNCTIONAL_ASSESSMENT: 0-10

## 2024-08-02 NOTE — OP NOTE
Operative Note      Patient: Mayelin Patterson  YOB: 1975  MRN: [id number]    Date of Procedure: 8/2/2024    Pre-Op Diagnosis Codes:     * Hallux valgus, right [M20.11]     * Tailor's bunionette, right [M21.621]    Post-Op Diagnosis: {MH OR SAME:[id number]}       Procedure(s):  RIGHT FOOT HALLUX VALGUS REPAIR WITH DOUBLE OSTEOTOMY, RIGHT FOOT 5TH METATARSAL OSTEOTOMY    Surgeon(s):  Mario Hutton DPM    Assistant:   Surgical Assistant: Bibi Goss    Anesthesia: Monitor Anesthesia Care    Estimated Blood Loss (mL): {NUMBERS; EBL:37653}    Complications: {Symptoms; Intra-op complications:58433}    Specimens:   * No specimens in log *    Implants:  Implant Name Type Inv. Item Serial No.  Lot No. LRB No. Used Action   BEVELED FT SCREW 4X42MM - SNA  BEVELED FT SCREW 4X42MM NA [employer] NA Right 1 Implanted   BEVELED FT SCREW 3.5X24MM - SNA  BEVELED FT SCREW 3.5X24MM NA [employer] NA Right 1 Implanted   HEADLESS DENICE SCRW 2.5X18MM - SNA  HEADLESS DENICE SCRW 2.5X18MM NA [employer] NA Right 2 Implanted         Drains: * No LDAs found *    Findings:  Infection Present At Time Of Surgery (PATOS) (choose all levels that have infection present):  {PATOS LEVELS:777351685}  Other Findings: ***    Detailed Description of Procedure:   ***    Electronically signed by Mario Hutton DPM on 8/2/2024 at 12:07 PM     extremity was prepped and draped in usual aseptic fashion.  A local infiltrative block was performed the right foot using 0.5% Marcaine without epinephrine.     Next attention was directed to the medial eminence of the first metatarsophalangeal joint of the right foot.  Fluoroscopy was used to identify the metaphysis of the first metatarsal distally.  Next a small stab incision was made at this level.  Next soft tissue attachments were dissected free to expose the medial aspect of the first metatarsal.  Next a sidecutting jessica was used to create a transverse osteotomy allowing for lateral shift of the capital fragment as well as rotation.  The capital fragment was shifted greater than 50% aligning the sesamoids in the appropriate position.  Next this was fixated using fully threaded headless screws from [employer] in oblique fashion.  Fixation and alignment was confirmed using fluoroscopy.  The wounds were then irrigated with copious amounts of normal saline and closed in layers.  Next attention was directed to the proximal phalanx which exhibited a lateral deviation.  Stab incision was made at the proximal metaphysis medially.  This was deepened down to the bone.  Next a closing wedge osteotomy was performed and then fixated with a headless screw placed in oblique fashion.  Fluoroscopy was used to confirm fixation and reduction of the deformity.  Wound was then irrigated and closed in layers.      Next attention was directed to aspect of the fifth metatarsal head.  A small stab incision was made at the distal metaphyseal region.  This was deepened down to bone.  Next a sidecutting jessica was used to produce a transverse osteotomy which was shifted into a medial more corrected position.  This was then fixated using a 2.5 mm headless screw.  Reduction of the deformity and fixation was confirmed using fluoroscopy.  The wound was then irrigated with copious amounts of normal saline and closed in layers.    Patient tolerated

## 2024-08-02 NOTE — BRIEF OP NOTE
Brief Postoperative Note      Patient: Mayelin Patterson  YOB: 1975  MRN: [id number]    Date of Procedure: 8/2/2024    Pre-Op Diagnosis Codes:     * Hallux valgus, right [M20.11]     * Tailor's bunionette, right [M21.621]    Post-Op Diagnosis: Same       Procedure(s):  RIGHT FOOT HALLUX VALGUS REPAIR WITH DOUBLE OSTEOTOMY, RIGHT FOOT 5TH METATARSAL OSTEOTOMY    Surgeon(s):  Mario Hutton DPM    Assistant:  Surgical Assistant: Bibi Goss    Anesthesia: Monitor Anesthesia Care    Estimated Blood Loss (mL): Minimal    Complications: None    Specimens:   * No specimens in log *    Implants:  Implant Name Type Inv. Item Serial No.  Lot No. LRB No. Used Action   BEVELED FT SCREW 4X42MM - SNA  BEVELED FT SCREW 4X42MM NA [employer] NA Right 1 Implanted   BEVELED FT SCREW 3.5X24MM - SNA  BEVELED FT SCREW 3.5X24MM NA [employer] NA Right 1 Implanted   HEADLESS DENICE SCRW 2.5X18MM - SNA  HEADLESS DENICE SCRW 2.5X18MM NA [employer] NA Right 2 Implanted         Drains: * No LDAs found *    Findings:  Infection Present At Time Of Surgery (PATOS) (choose all levels that have infection present):  No infection present  Other Findings: hallux valgus, tailors bunion right foot    Electronically signed by Mario Hutton DPM on 8/2/2024 at 12:07 PM

## 2024-08-02 NOTE — DISCHARGE INSTRUCTIONS
Dr. Mario Hutton, DPM FACFAS FACFAOM FACCWS    [facility]  Post Operative Instructions:    You have had a surgical procedure on your right foot.      Fluids and Diet:  Begin with clear liquids, broth, dry toast, and crackers.  If not nauseated then resume your regular pre-operative diet when you are ready    Medications:  Take your prescriptions as directed  If your pain is not severe then you may take the non-prescription medication that you normally take for aches and pains  You may resume your regularly scheduled medications (unless otherwise directed)  If any side effects or adverse reactions occur, discontinue the medication and contact your doctor.  Review the patient drug information that is provided before you take any medication    Ambulation and Activity:  You are advised to go directly home from the hospital  Use walker, crutches as needed  You may put weight on the operated foot.  You should wear the surgical shoe at all times when awake.  Avoid stairs if possible.  Do not lift or move heavy objects  Do not drive until cleared by Dr. Hutton    Bandage and Wound Care Instructions:  Keep bandage clean and dry  Do not shower or bathe the operative extremity  Do not remove the bandage (unless otherwise directed)  Do not attempt to put anything between the cast or dressing and your skin, some itching is normal.    Ice and Elevation:  Elevate operative extremity as much as possible to reduce swelling and discomfort.  Elevate with 2 pillows at or above the level of the heart for the first 72 hours.  Ice:  Apply Hospital dispensed insulated ice bag over the bandage 20 minutes of every hour while awake for the first 72 hours.  You may behind the knee as well.    Special Instructions: Call your doctor immediately if you develop any of the following.  Fever over 100 degrees by mouth - take your temperature daily until your first follow up visit.  Pain not relieved by medication ordered  Swelling,  increased redness, warmth, or hardness around operative area.  Numb, tingling or cold toes.  Toe(s) become white or bluish  Bandage becomes wet, soiled, or blood soaked (small amount of bleeding may be normal)  Increased or progressive drainage from surgical area.    Follow up instructions:  Your first post operative appointment is scheduled for Tuesday    Call Dr. Hutton's office if you have any questions or concerns.

## 2024-08-02 NOTE — ANESTHESIA PRE PROCEDURE
Department of Anesthesiology  Preprocedure Note       Name:  Mayelin Patterson   Age:  49 y.o.  :  1975                                          MRN:  [id number]         Date:  2024      Surgeon: Surgeon(s):  Mario Hutton DPM    Procedure: Procedure(s):  RIGHT FOOT HALLUX VALGUS REPAIR WITH DOUBLE OSTEOTOMY, RIGHT FOOT 5TH METATARSAL OSTEOTOMY (MAC/LOCAL)    Medications prior to admission:   Prior to Admission medications    Medication Sig Start Date End Date Taking? Authorizing Provider   traZODone (DESYREL) 50 MG tablet Take 1 tablet by mouth nightly    ProviderMeredith MD   buPROPion (WELLBUTRIN XL) 300 MG extended release tablet Take 1 tablet by mouth every morning    Meredith Urias MD   triamcinolone (KENALOG) 0.1 % cream Apply topically as needed Apply topically 2 times daily.    ProviderMeredith MD   ibuprofen (ADVIL;MOTRIN) 200 MG tablet Take 2 tablets by mouth as needed for Pain    ProviderMeredith MD   Acetaminophen (TYLENOL EXTRA STRENGTH PO) Take 1,000 mg by mouth as needed    ProviderMeredith MD       Current medications:    Current Facility-Administered Medications   Medication Dose Route Frequency Provider Last Rate Last Admin   • lidocaine PF 1 % injection 1 mL  1 mL IntraDERmal Once PRN Checo Ortiz MD       • fentaNYL (SUBLIMAZE) injection 100 mcg  100 mcg IntraVENous Once PRN Checo Ortiz MD       • lactated ringers IV soln infusion   IntraVENous Continuous Checo Ortiz MD       • midazolam PF (VERSED) injection 2 mg  2 mg IntraVENous Once PRN Checo Ortiz MD       • ceFAZolin (ANCEF) 2,000 mg in sterile water 20 mL IV syringe  2,000 mg IntraVENous Once Mario Hutton DPM           Allergies:  No Known Allergies    Problem List:  There is no problem list on file for this patient.      Past Medical History:        Diagnosis Date   • Anxiety    • Classic migraine    • Depression    • GERD (gastroesophageal reflux disease)    •

## 2024-08-02 NOTE — H&P
[facility] Podiatric Surgery  H & P Note    Date: August 2, 2024  Patient: Mayelin Patterson  MR #: [id number]  [facility] #: [id number]  Attending/Admitting Physician: Mario Bazan DPM      History of Present Illness:  Patient is a 49 y.o. female presents for surgical correction of right foot deformities.    Blood pressure 124/72, pulse 76, temperature 98.4 °F (36.9 °C), temperature source Oral, resp. rate 12, height 1.6 m (5' 3\"), weight 67.7 kg (149 lb 4 oz), last menstrual period 07/25/2024, SpO2 99 %.  No intake or output data in the 24 hours ending 08/02/24 1024      Medical History:  Past Medical History:   Diagnosis Date    Anxiety     Classic migraine     Depression     GERD (gastroesophageal reflux disease)     PONV (postoperative nausea and vomiting)      Past Surgical History:   Procedure Laterality Date    COLONOSCOPY      OTHER SURGICAL HISTORY  2016    tummy tuck, breast lift    WISDOM TOOTH EXTRACTION       Medications Prior to Admission: traZODone (DESYREL) 50 MG tablet, Take 1 tablet by mouth nightly  buPROPion (WELLBUTRIN XL) 300 MG extended release tablet, Take 1 tablet by mouth every morning  triamcinolone (KENALOG) 0.1 % cream, Apply topically as needed Apply topically 2 times daily.  ibuprofen (ADVIL;MOTRIN) 200 MG tablet, Take 2 tablets by mouth as needed for Pain  Acetaminophen (TYLENOL EXTRA STRENGTH PO), Take 1,000 mg by mouth as needed  No Known Allergies  Family History   Problem Relation Age of Onset    Other Mother         ALS    Headache Father     Heart Disease Maternal Grandmother     Canavan Disease Maternal Grandfather     Stroke Paternal Grandfather     Cancer Maternal Grandfather         skin    Dementia Maternal Grandmother      Social History     Tobacco Use   Smoking Status Never   Smokeless Tobacco Never     Social History     Substance and Sexual Activity   Drug Use Yes    Frequency: 1.0 times per week    Types: Marijuana (Weed)    Comment: edibles     Social History  surgery, including but not limited to blood clots, infection and anesthesia risks have been discussed and explained. At the conclusion of this discussion the patient has made an informed decision to proceed with the intended treatment plan.      Mario Hutton DPM FACFAS FACCWS FACFAOM  [facility] Board Certified Foot & Ankle Specialist  [phone] cell  8/2/2024  10:24 AM

## 2024-10-25 ENCOUNTER — HOSPITAL ENCOUNTER (OUTPATIENT)
Facility: HOSPITAL | Age: 49
Setting detail: OUTPATIENT SURGERY
Discharge: HOME | End: 2024-10-25
Attending: PODIATRIST | Admitting: PODIATRIST
Payer: COMMERCIAL

## 2024-10-25 VITALS
DIASTOLIC BLOOD PRESSURE: 59 MMHG | OXYGEN SATURATION: 96 % | HEIGHT: 68 IN | RESPIRATION RATE: 11 BRPM | SYSTOLIC BLOOD PRESSURE: 115 MMHG | TEMPERATURE: 98.2 F | HEART RATE: 86 BPM | WEIGHT: 150.13 LBS | BODY MASS INDEX: 22.75 KG/M2

## 2024-10-25 LAB — HCG UR QL: NEGATIVE

## 2024-10-25 PROCEDURE — 7100000011 HC PHASE II RECOVERY - ADDTL 15 MIN: Performed by: PODIATRIST

## 2024-10-25 PROCEDURE — 7100000010 HC PHASE II RECOVERY - FIRST 15 MIN: Performed by: PODIATRIST

## 2024-10-25 PROCEDURE — 2709999900 HC NON-CHARGEABLE SUPPLY: Performed by: PODIATRIST

## 2024-10-25 PROCEDURE — 81025 URINE PREGNANCY TEST: CPT

## 2024-10-25 PROCEDURE — 3600000012 HC SURGERY LEVEL 2 ADDTL 15MIN: Performed by: PODIATRIST

## 2024-10-25 PROCEDURE — 3600000002 HC SURGERY LEVEL 2 BASE: Performed by: PODIATRIST

## 2024-10-25 PROCEDURE — 6360000002 HC RX W HCPCS: Performed by: PODIATRIST

## 2024-10-25 RX ORDER — DIAZEPAM 2 MG/1
2 TABLET ORAL EVERY 6 HOURS PRN
COMMUNITY

## 2024-10-25 RX ORDER — BUPIVACAINE HYDROCHLORIDE 5 MG/ML
INJECTION, SOLUTION PERINEURAL PRN
Status: DISCONTINUED | OUTPATIENT
Start: 2024-10-25 | End: 2024-10-25 | Stop reason: ALTCHOICE

## 2024-10-25 ASSESSMENT — PAIN - FUNCTIONAL ASSESSMENT: PAIN_FUNCTIONAL_ASSESSMENT: 0-10

## 2024-10-25 NOTE — H&P
[facility] Podiatric Surgery  H & P Note    Date: October 25, 2024  Patient: Mayelin Patterson  MR #: [id number]  [facility] #: [id number]  Attending/Admitting Physician: Mairo Bazan DPM      History of Present Illness:  Patient is a 49 y.o. female presents for surgical correction of right foot deformities.    Blood pressure (!) 112/56, pulse 79, temperature 97.7 °F (36.5 °C), temperature source Oral, resp. rate 18, height 1.727 m (5' 8\"), weight 68.1 kg (150 lb 2.1 oz), SpO2 97%.  No intake or output data in the 24 hours ending 10/25/24 0731      Medical History:  Past Medical History:   Diagnosis Date    Anxiety     Classic migraine     Depression     GERD (gastroesophageal reflux disease)     PONV (postoperative nausea and vomiting)      Past Surgical History:   Procedure Laterality Date    BUNIONECTOMY Right 8/2/2024    RIGHT FOOT HALLUX VALGUS REPAIR WITH DOUBLE OSTEOTOMY, RIGHT FOOT 5TH METATARSAL OSTEOTOMY performed by Mario Hutton DPM at [facility] MAIN OR    COLONOSCOPY      OTHER SURGICAL HISTORY  2016    tummy tuck, breast lift    WISDOM TOOTH EXTRACTION       Medications Prior to Admission: diazePAM (VALIUM) 2 MG tablet, Take 1 tablet by mouth every 6 hours as needed for Anxiety. Max Daily Amount: 8 mg  traZODone (DESYREL) 50 MG tablet, Take 1 tablet by mouth nightly  buPROPion (WELLBUTRIN XL) 300 MG extended release tablet, Take 1 tablet by mouth every morning  triamcinolone (KENALOG) 0.1 % cream, Apply topically as needed Apply topically 2 times daily.  ibuprofen (ADVIL;MOTRIN) 200 MG tablet, Take 2 tablets by mouth as needed for Pain  Acetaminophen (TYLENOL EXTRA STRENGTH PO), Take 1,000 mg by mouth as needed  No Known Allergies  Family History   Problem Relation Age of Onset    Other Mother         ALS    Headache Father     Heart Disease Maternal Grandmother     Canavan Disease Maternal Grandfather     Stroke Paternal Grandfather     Cancer Maternal Grandfather         skin    Dementia Maternal

## 2024-10-25 NOTE — DISCHARGE INSTRUCTIONS
Dr. Mario Hutton, DPM FACFAS FACFAOM FACCWS    [facility]  Post Operative Instructions:    You have had a surgical procedure on your right foot.      Fluids and Diet:  Begin with clear liquids, broth, dry toast, and crackers.  If not nauseated then resume your regular pre-operative diet when you are ready    Medications:  Take your prescriptions as directed  You are receiving new prescriptions for {MEDS; ANALGESICS:15630} {abx:49436:a}  If your pain is not severe then you may take the non-prescription medication that you normally take for aches and pains  You may resume your regularly scheduled medications (unless otherwise directed)  If any side effects or adverse reactions occur, discontinue the medication and contact your doctor.  Review the patient drug information that is provided before you take any medication    Ambulation and Activity:  You are advised to go directly home from the hospital  Use {Assistive Devices DME:52983:o} as needed  You {Actions; may/not:63599:o} put weight on the operated foot.  You should wear the surgical shoe at all times when awake.  Avoid stairs if possible.  Do not lift or move heavy objects  Do not drive until cleared by Dr. Hutton    Bandage and Wound Care Instructions:  Keep bandage clean and dry  Do not shower or bathe the operative extremity  Do not remove the bandage (unless otherwise directed)  Do not attempt to put anything between the cast or dressing and your skin, some itching is normal.    Ice and Elevation:  Elevate operative extremity as much as possible to reduce swelling and discomfort.  Elevate with 2 pillows at or above the level of the heart for the first 72 hours.  Ice:  Apply Hospital dispensed insulated ice bag over the bandage 20 minutes of every hour while awake for the first 72 hours.  You {Actions; may/not:80566:o} behind the knee as well.    Special Instructions: Call your doctor immediately if you develop any of the following.  Fever over

## 2024-10-25 NOTE — BRIEF OP NOTE
Brief Postoperative Note      Patient: Mayelin Patterson  YOB: 1975  MRN: [id number]    Date of Procedure: 10/25/2024    Pre-Op Diagnosis Codes:   First metatarsal osteophyte right foot    Post-Op Diagnosis: Same       Procedure(s):  RIGHT FOOT EXCISION OF BONE SPUR    Surgeon(s):  Mario Hutton DPM    Assistant:  Surgical Assistant: Sonja Randolph    Anesthesia: Local    Estimated Blood Loss (mL): Minimal    Complications: None    Specimens:   * No specimens in log *    Implants:  * No implants in log *      Drains: * No LDAs found *    Findings:  Infection Present At Time Of Surgery (PATOS) (choose all levels that have infection present):  No infection present  Other Findings: osteophyte right foot dorsal first metatarsal     Electronically signed by Mario Hutton DPM on 10/25/2024 at 8:21 AM

## 2024-10-25 NOTE — OP NOTE
Operative Note      Patient: Mayelin Patterson  YOB: 1975  MRN: [id number]    Date of Procedure: 10/25/2024    Pre-Op Diagnosis Codes:      * Calcaneal spur of right foot [M77.31]    Post-Op Diagnosis: {MH OR SAME:093170559}       Procedure(s):  RIGHT FOOT EXCISION OF BONE SPUR    Surgeon(s):  Mario Hutton DPM    Assistant:   Surgical Assistant: Sonja Randolph    Anesthesia: Local    Estimated Blood Loss (mL): {NUMBERS; EBL:14936}    Complications: {Symptoms; Intra-op complications:69805}    Specimens:   * No specimens in log *    Implants:  * No implants in log *      Drains: * No LDAs found *    Findings:  Infection Present At Time Of Surgery (PATOS) (choose all levels that have infection present):  {PATOS LEVELS:505805123}  Other Findings: ***    Detailed Description of Procedure:   ***    Electronically signed by Mario Hutton DPM on 10/25/2024 at 8:22 AM     This was confirmed using fluoroscopy.  The wound was then irrigated with copious amounts of normal saline and closed in layers.  Patient tolerated the procedure and anesthesia well and a postoperative dressing was applied.  She was then transferred to the PACU for postoperative monitoring.  From there she will be discharged home and follow-up my office next week.    Electronically signed by Mario Hutton DPM on 10/25/2024 at 8:22 AM

## 2024-10-28 ENCOUNTER — HOSPITAL ENCOUNTER (OUTPATIENT)
Facility: HOSPITAL | Age: 49
Discharge: HOME | End: 2024-10-31

## 2024-11-13 ENCOUNTER — HOSPITAL ENCOUNTER (OUTPATIENT)
Facility: HOSPITAL | Age: 49
Setting detail: RECURRING SERIES
Discharge: HOME | End: 2024-11-16
Payer: COMMERCIAL

## 2024-11-13 PROCEDURE — 97535 SELF CARE MNGMENT TRAINING: CPT | Performed by: PHYSICAL THERAPIST

## 2024-11-13 PROCEDURE — 97110 THERAPEUTIC EXERCISES: CPT | Performed by: PHYSICAL THERAPIST

## 2024-11-13 PROCEDURE — 97162 PT EVAL MOD COMPLEX 30 MIN: CPT | Performed by: PHYSICAL THERAPIST

## 2024-11-13 NOTE — THERAPY EVALUATION
Physical Therapy at [facility],   a part of [facility]  [address] [place] [address], [address]  [place], [place] [address]  Phone: [phone]  Fax: [phone]       PHYSICAL THERAPY - MEDICARE EVALUATION/PLAN OF CARE NOTE (updated 3/23)      Date: 2024          Patient Name:  Mayelin Patterson :  1975   Medical   Diagnosis:  Right foot pain [M79.671]  Other abnormalities of gait and mobility [R26.89] Treatment Diagnosis:  M79.671  RIGHT FOOT PAIN M54.59  OTHER LOWER BACK PAIN M62.81  GENERAL MUSCLE WEAKNESS and R26.89   Abnormalities of gait and mobility    Referral Source:  Mario Hutton DPM Provider #:  [id number]                Insurance: Payor: RUBEN / Plan: [facility] [employer] / Product Type: *No Product type* /      Patient  verified yes     Visit #   Current  / Total 1 24   Time   In / Out 4:15p 5:15p   Total Treatment Time 60   Total Timed Codes 30   1:1 Treatment Time 30    [facility] Totals Reminder:  bill using total billable   min of TIMED therapeutic procedures and modalities.   8-22 min = 1 unit; 23-37 min = 2 units; 38-52 min = 3 units;  53-67 min = 4 units; 68-82 min = 5 units           SUBJECTIVE  Pain Level (0-10 scale): Today: 1 Worst: 10 Lowest: 0   []constant [x]intermittent []improving []worsening []no change since onset    Any medication changes, allergies to medications, adverse drug reactions, diagnosis change, or new procedure performed?: [x] No    [] Yes (see summary sheet for update)  Medications: Verified on Patient Summary List    Subjective functional status/changes:     Mrs. Patterson is a 49 year old female s/p R bunionectomy performed 10/25/24 WBAT     Start of Care: 2024  Onset Date: Chronic, surgery 10/25/25 and 24  Current symptoms/Complaints: Foot pain and L hip and lower back  Mechanism of Injury: post-op  PLOF: Ind with Adls, hiking, yoga  Limitations to PLOF/Activity or Recreational Limitations: Pain with walking, yoga,

## 2024-11-22 ENCOUNTER — APPOINTMENT (OUTPATIENT)
Facility: HOSPITAL | Age: 49
End: 2024-11-22
Payer: COMMERCIAL

## 2024-12-03 ENCOUNTER — APPOINTMENT (OUTPATIENT)
Facility: HOSPITAL | Age: 49
End: 2024-12-03
Payer: COMMERCIAL

## 2024-12-10 ENCOUNTER — APPOINTMENT (OUTPATIENT)
Facility: HOSPITAL | Age: 49
End: 2024-12-10
Payer: COMMERCIAL

## 2024-12-17 ENCOUNTER — HOSPITAL ENCOUNTER (OUTPATIENT)
Facility: HOSPITAL | Age: 49
Setting detail: RECURRING SERIES
Discharge: HOME | End: 2024-12-20
Payer: COMMERCIAL

## 2024-12-17 PROCEDURE — 97140 MANUAL THERAPY 1/> REGIONS: CPT

## 2024-12-17 PROCEDURE — 97110 THERAPEUTIC EXERCISES: CPT

## 2024-12-17 PROCEDURE — 97112 NEUROMUSCULAR REEDUCATION: CPT

## 2024-12-17 NOTE — PROGRESS NOTES
vcs for proper form and proper weight shift.    Pain Level at end of session (0-10 scale): 0/10      Assessment     Patient will continue to benefit from skilled PT / OT services to modify and progress therapeutic interventions, analyze and address functional mobility deficits, analyze and address ROM deficits, analyze and address strength deficits, analyze and address soft tissue restrictions, analyze and cue for proper movement patterns, and analyze and modify for postural abnormalities to address functional deficits and attain remaining goals.    Progress toward goals / Updated goals:  []  See Progress Note/Recertification    Continue to progress as tolerated      PLAN  YES Continue plan of care  Re-Cert Due: NA  [x]  Upgrade activities as tolerated  []  Discharge due to :  []  Other:      LOCO SHIPMAN, OLGA       12/17/2024       3:11 PM

## (undated) DEVICE — GLOVE SURG SZ 8 L12IN FNGR THK79MIL GRN LTX FREE

## (undated) DEVICE — CANISTER, RIGID, 3000CC: Brand: MEDLINE INDUSTRIES, INC.

## (undated) DEVICE — CANNULATED DRILL BIT AR874132S

## (undated) DEVICE — SYRINGE MED 10ML LUERLOCK TIP W/O SFTY DISP

## (undated) DEVICE — PREP KIT PEEL PTCH POVIDONE IOD

## (undated) DEVICE — DRAPE C ARM W/ PLT PROTCT NEO

## (undated) DEVICE — DRESSING PETRO W3XL3IN OIL EMUL N ADH GZ KNIT IMPREG CELOS

## (undated) DEVICE — GUIDEWIRE AR874116

## (undated) DEVICE — ZIMMER® STERILE DISPOSABLE TOURNIQUET CUFF WITH PROTECTIVE SLEEVE AND PLC, DUAL PORT, SINGLE BLADDER, 18 IN. (46 CM)

## (undated) DEVICE — GUIDEWIRE AR874114

## (undated) DEVICE — PADDING CAST W4INXL4YD ST COT RAYON MICROPLEATED HIGHLY

## (undated) DEVICE — SUTURE N ABSRB L 18 IN SZ 4-0 NDL L 19 MM NYL MONOFILAMENT

## (undated) DEVICE — CANNULATED DRILL BIT 2.9MM

## (undated) DEVICE — GOWN,SIRUS,NONRNF,SETINSLV,2XL,18/CS: Brand: MEDLINE

## (undated) DEVICE — SPONGE GZ W4XL4IN COT 12 PLY TYP VII WVN C FLD DSGN STERILE

## (undated) DEVICE — SOLUTION IRRIG 500ML 0.9% SOD CHLO USP POUR PLAS BTL

## (undated) DEVICE — EXTREMITY-SFMCASU: Brand: MEDLINE INDUSTRIES, INC.

## (undated) DEVICE — SOLUTION IRRIG 1000ML STRL H2O USP PLAS POUR BTL

## (undated) DEVICE — BLADE SAW W9XL25MM THK051MM CUT THK074MM REPL S STL SAG

## (undated) DEVICE — GLOVE ORANGE PI 8   MSG9080

## (undated) DEVICE — WIRE FIX L80MM OD0.86MM K